# Patient Record
Sex: FEMALE | Race: WHITE | HISPANIC OR LATINO | Employment: UNEMPLOYED | URBAN - METROPOLITAN AREA
[De-identification: names, ages, dates, MRNs, and addresses within clinical notes are randomized per-mention and may not be internally consistent; named-entity substitution may affect disease eponyms.]

---

## 2023-03-20 ENCOUNTER — OFFICE VISIT (OUTPATIENT)
Dept: FAMILY MEDICINE CLINIC | Facility: CLINIC | Age: 33
End: 2023-03-20

## 2023-03-20 VITALS
RESPIRATION RATE: 18 BRPM | DIASTOLIC BLOOD PRESSURE: 68 MMHG | HEART RATE: 69 BPM | WEIGHT: 153.3 LBS | TEMPERATURE: 96.7 F | OXYGEN SATURATION: 99 % | HEIGHT: 61 IN | BODY MASS INDEX: 28.94 KG/M2 | SYSTOLIC BLOOD PRESSURE: 110 MMHG

## 2023-03-20 DIAGNOSIS — K02.9 DENTAL CAVITY: ICD-10-CM

## 2023-03-20 DIAGNOSIS — Z00.00 ANNUAL PHYSICAL EXAM: Primary | ICD-10-CM

## 2023-03-20 NOTE — PROGRESS NOTES
[address] [facility]    NAME: Socorro Jones  AGE: 28 y o  SEX: female  : 1990     DATE: 3/20/2023     Assessment and Plan:     Problem List Items Addressed This Visit        Digestive    Dental cavity     Follow with dentist         Relevant Orders    Ambulatory Referral to Dentistry       Other    Annual physical exam - Primary     Patient requesting referral to dentis and gyn          Relevant Orders    Ambulatory Referral to Obstetrics / Gynecology       Immunizations and preventive care screenings were discussed with patient today  Appropriate education was printed on patient's after visit summary  Counseling:  · Exercise: the importance of regular exercise/physical activity was discussed  Recommend exercise 3-5 times per week for at least 30 minutes  BMI Counseling: Body mass index is 28 94 kg/m²  The BMI is above normal  Nutrition recommendations include decreasing portion sizes, encouraging healthy choices of fruits and vegetables, decreasing fast food intake, consuming healthier snacks, limiting drinks that contain sugar, moderation in carbohydrate intake, increasing intake of lean protein, reducing intake of saturated and trans fat and reducing intake of cholesterol  Exercise recommendations include exercising 3-5 times per week  No pharmacotherapy was ordered  Rationale for BMI follow-up plan is due to patient being overweight or obese  Depression Screening and Follow-up Plan: Patient's depression screening was positive with a PHQ-2 score of 4  Their PHQ-9 score was 4  Return in 1 year (on 3/20/2024)  Chief Complaint:     Chief Complaint   Patient presents with   • New Patient Visit   • Anxiety   • Depression      History of Present Illness:   Socorro Jones 28 y o  female has a past medical history of Anxiety and Depression   Presenting today for annual physical  Patient migrated from [facility] four months ago and is currently trying to seek employment  Attempted to make appointment with dental clinic and they advise her to establish with pcp to obtain financial assistance and a referral  Patient has no current complaints, when asked about her history of anxiety and depression patient reports she feels better, symptoms were related to her migration to the [address] [place] [address] [address]; denies any symptoms at this time PHQ9- 4  Patient denies chest pain, SOB, dyspnea, dizziness; she is well appearing  Adult Annual Physical   Patient here for a comprehensive physical exam  The patient reports no problems  Diet and Physical Activity  · Diet/Nutrition: well balanced diet and consuming 3-5 servings of fruits/vegetables daily  · Exercise: no formal exercise  Depression Screening  PHQ-2/9 Depression Screening    Little interest or pleasure in doing things: 2 - more than half the days  Feeling down, depressed, or hopeless: 2 - more than half the days  Trouble falling or staying asleep, or sleeping too much: 0 - not at all  Feeling tired or having little energy: 0 - not at all  Poor appetite or overeatin - not at all  Feeling bad about yourself - or that you are a failure or have let yourself or your family down: 0 - not at all  Trouble concentrating on things, such as reading the newspaper or watching television: 0 - not at all  Moving or speaking so slowly that other people could have noticed  Or the opposite - being so fidgety or restless that you have been moving around a lot more than usual: 0 - not at all  Thoughts that you would be better off dead, or of hurting yourself in some way: 0 - not at all  PHQ-2 Score: 4  PHQ-2 Interpretation: POSITIVE depression screen  PHQ-9 Score: 4   PHQ-9 Interpretation: No or Minimal depression        General Health  · Sleep: gets 7-8 hours of sleep on average  · Hearing: normal - bilateral   · Vision: no vision problems     · Dental: no dental visits for >1 year, brushes teeth three times daily and flosses teeth occasionally  /GYN Health  · Last menstrual period: 1/10/2023  · Contraceptive method: injectable contraception  · History of STDs?: no      Review of Systems:     Review of Systems   Constitutional: Negative for chills and fever  HENT: Negative for ear pain and sore throat  Eyes: Negative for pain and visual disturbance  Respiratory: Negative for cough and shortness of breath  Cardiovascular: Negative for chest pain and palpitations  Gastrointestinal: Negative for abdominal pain and vomiting  Genitourinary: Negative for dysuria and hematuria  Musculoskeletal: Negative for arthralgias and back pain  Skin: Negative for color change and rash  Neurological: Negative for seizures and syncope  Psychiatric/Behavioral: Negative for behavioral problems, decreased concentration, sleep disturbance and suicidal ideas  The patient is not nervous/anxious  All other systems reviewed and are negative       Past Medical History:     Past Medical History:   Diagnosis Date   • Anxiety    • Depression       Past Surgical History:     Past Surgical History:   Procedure Laterality Date   •  SECTION        Social History:     Social History     Socioeconomic History   • Marital status:      Spouse name: None   • Number of children: None   • Years of education: None   • Highest education level: None   Occupational History   • None   Tobacco Use   • Smoking status: Never   • Smokeless tobacco: Never   Vaping Use   • Vaping Use: Never used   Substance and Sexual Activity   • Alcohol use: Not Currently   • Drug use: Never   • Sexual activity: None   Other Topics Concern   • None   Social History Narrative   • None     Social Determinants of Health     Financial Resource Strain: Not on file   Food Insecurity: Not on file   Transportation Needs: Not on file   Physical Activity: Not on file   Stress: Not on file   Social Connections: Not on file Intimate Partner Violence: Not on file   Housing Stability: Not on file      Family History:     Family History   Problem Relation Age of Onset   • Diabetes Mother    • Hypertension Mother    • Colon cancer Father    • Colon cancer Sister       Current Medications:     No current outpatient medications on file  No current facility-administered medications for this visit  Allergies:     No Known Allergies   Physical Exam:     /68 (BP Location: Left arm, Patient Position: Sitting, Cuff Size: Adult)   Pulse 69   Temp (!) 96 7 °F (35 9 °C) (Temporal)   Resp 18   Ht 5' 1 02" (1 55 m)   Wt 69 5 kg (153 lb 4 8 oz)   LMP  (LMP Unknown)   SpO2 99%   BMI 28 94 kg/m²     Physical Exam  Vitals and nursing note reviewed  Constitutional:       General: She is not in acute distress  Appearance: She is well-developed  HENT:      Head: Normocephalic and atraumatic  Right Ear: Tympanic membrane, ear canal and external ear normal       Left Ear: Tympanic membrane, ear canal and external ear normal       Nose: Nose normal       Mouth/Throat:      Mouth: Mucous membranes are moist      Eyes:      Conjunctiva/sclera: Conjunctivae normal    Cardiovascular:      Rate and Rhythm: Normal rate and regular rhythm  Pulses: Normal pulses  Heart sounds: Normal heart sounds  No murmur heard  Pulmonary:      Effort: Pulmonary effort is normal  No respiratory distress  Breath sounds: Normal breath sounds  Abdominal:      General: Bowel sounds are normal       Palpations: Abdomen is soft  Tenderness: There is no abdominal tenderness  Musculoskeletal:         General: No swelling  Normal range of motion  Cervical back: Normal range of motion and neck supple  Skin:     General: Skin is warm and dry  Capillary Refill: Capillary refill takes less than 2 seconds  Neurological:      Mental Status: She is alert     Psychiatric:         Attention and Perception: Attention normal  Mood and Affect: Mood normal          Speech: Speech normal          Behavior: Behavior normal          Thought Content: Thought content normal  Thought content does not include suicidal ideation  Thought content does not include suicidal plan            Lilliana Spainj 33

## 2023-03-22 ENCOUNTER — TELEPHONE (OUTPATIENT)
Dept: FAMILY MEDICINE CLINIC | Facility: CLINIC | Age: 33
End: 2023-03-22

## 2023-03-22 NOTE — TELEPHONE ENCOUNTER
Left voicemail to advise patient office vsiit note from 3/20 was signed  A copy was placed in  bin for her to come and get

## 2025-02-05 ENCOUNTER — HOSPITAL ENCOUNTER (EMERGENCY)
Facility: HOSPITAL | Age: 35
Discharge: HOME | End: 2025-02-05
Attending: INTERNAL MEDICINE
Payer: COMMERCIAL

## 2025-02-05 VITALS
OXYGEN SATURATION: 99 % | RESPIRATION RATE: 16 BRPM | SYSTOLIC BLOOD PRESSURE: 112 MMHG | DIASTOLIC BLOOD PRESSURE: 71 MMHG | BODY MASS INDEX: 28.3 KG/M2 | WEIGHT: 149.9 LBS | TEMPERATURE: 97.8 F | HEART RATE: 65 BPM

## 2025-02-05 DIAGNOSIS — R51.9 HEADACHE: Primary | ICD-10-CM

## 2025-02-05 DIAGNOSIS — M62.838 TRAPEZIUS MUSCLE SPASM: ICD-10-CM

## 2025-02-05 DIAGNOSIS — R42 LIGHTHEADEDNESS: ICD-10-CM

## 2025-02-05 LAB
ALBUMIN SERPL BCG-MCNC: 4.1 G/DL (ref 3.5–5)
ALP SERPL-CCNC: 90 U/L (ref 34–104)
ALT SERPL W P-5'-P-CCNC: 14 U/L (ref 7–52)
ANION GAP SERPL CALCULATED.3IONS-SCNC: 6 MMOL/L (ref 4–13)
AST SERPL W P-5'-P-CCNC: 16 U/L (ref 13–39)
ATRIAL RATE: 75 BPM
BASOPHILS # BLD AUTO: 0.03 THOUSANDS/ΜL (ref 0–0.1)
BASOPHILS NFR BLD AUTO: 0 % (ref 0–1)
BILIRUB SERPL-MCNC: 0.5 MG/DL (ref 0.2–1)
BUN SERPL-MCNC: 12 MG/DL (ref 5–25)
CALCIUM SERPL-MCNC: 8.8 MG/DL (ref 8.4–10.2)
CARDIAC TROPONIN I PNL SERPL HS: <2 NG/L (ref ?–50)
CHLORIDE SERPL-SCNC: 105 MMOL/L (ref 96–108)
CO2 SERPL-SCNC: 26 MMOL/L (ref 21–32)
CREAT SERPL-MCNC: 0.59 MG/DL (ref 0.6–1.3)
EOSINOPHIL # BLD AUTO: 1.07 THOUSAND/ΜL (ref 0–0.61)
EOSINOPHIL NFR BLD AUTO: 13 % (ref 0–6)
ERYTHROCYTE [DISTWIDTH] IN BLOOD BY AUTOMATED COUNT: 13.1 % (ref 11.6–15.1)
EXT PREGNANCY TEST URINE: NEGATIVE
EXT. CONTROL: NORMAL
GFR SERPL CREATININE-BSD FRML MDRD: 118 ML/MIN/1.73SQ M
GLUCOSE SERPL-MCNC: 88 MG/DL (ref 65–140)
GLUCOSE SERPL-MCNC: 90 MG/DL (ref 65–140)
HCT VFR BLD AUTO: 37.2 % (ref 34.8–46.1)
HGB BLD-MCNC: 12.2 G/DL (ref 11.5–15.4)
IMM GRANULOCYTES # BLD AUTO: 0.03 THOUSAND/UL (ref 0–0.2)
IMM GRANULOCYTES NFR BLD AUTO: 0 % (ref 0–2)
LYMPHOCYTES # BLD AUTO: 2.33 THOUSANDS/ΜL (ref 0.6–4.47)
LYMPHOCYTES NFR BLD AUTO: 28 % (ref 14–44)
MCH RBC QN AUTO: 30.2 PG (ref 26.8–34.3)
MCHC RBC AUTO-ENTMCNC: 32.8 G/DL (ref 31.4–37.4)
MCV RBC AUTO: 92 FL (ref 82–98)
MONOCYTES # BLD AUTO: 0.83 THOUSAND/ΜL (ref 0.17–1.22)
MONOCYTES NFR BLD AUTO: 10 % (ref 4–12)
NEUTROPHILS # BLD AUTO: 4.01 THOUSANDS/ΜL (ref 1.85–7.62)
NEUTS SEG NFR BLD AUTO: 49 % (ref 43–75)
NRBC BLD AUTO-RTO: 0 /100 WBCS
P AXIS: 26 DEGREES
PLATELET # BLD AUTO: 272 THOUSANDS/UL (ref 149–390)
PMV BLD AUTO: 10.7 FL (ref 8.9–12.7)
POTASSIUM SERPL-SCNC: 3.9 MMOL/L (ref 3.5–5.3)
PR INTERVAL: 134 MS
PROT SERPL-MCNC: 7.3 G/DL (ref 6.4–8.4)
QRS AXIS: 54 DEGREES
QRSD INTERVAL: 76 MS
QT INTERVAL: 386 MS
QTC INTERVAL: 432 MS
RBC # BLD AUTO: 4.04 MILLION/UL (ref 3.81–5.12)
SODIUM SERPL-SCNC: 137 MMOL/L (ref 135–147)
T WAVE AXIS: 51 DEGREES
VENTRICULAR RATE: 75 BPM
WBC # BLD AUTO: 8.3 THOUSAND/UL (ref 4.31–10.16)

## 2025-02-05 PROCEDURE — 96361 HYDRATE IV INFUSION ADD-ON: CPT

## 2025-02-05 PROCEDURE — 99284 EMERGENCY DEPT VISIT MOD MDM: CPT

## 2025-02-05 PROCEDURE — 81025 URINE PREGNANCY TEST: CPT | Performed by: INTERNAL MEDICINE

## 2025-02-05 PROCEDURE — 85025 COMPLETE CBC W/AUTO DIFF WBC: CPT | Performed by: INTERNAL MEDICINE

## 2025-02-05 PROCEDURE — 93010 ELECTROCARDIOGRAM REPORT: CPT | Performed by: INTERNAL MEDICINE

## 2025-02-05 PROCEDURE — 82948 REAGENT STRIP/BLOOD GLUCOSE: CPT

## 2025-02-05 PROCEDURE — 80053 COMPREHEN METABOLIC PANEL: CPT | Performed by: INTERNAL MEDICINE

## 2025-02-05 PROCEDURE — 96374 THER/PROPH/DIAG INJ IV PUSH: CPT

## 2025-02-05 PROCEDURE — 36415 COLL VENOUS BLD VENIPUNCTURE: CPT | Performed by: INTERNAL MEDICINE

## 2025-02-05 PROCEDURE — 84484 ASSAY OF TROPONIN QUANT: CPT | Performed by: INTERNAL MEDICINE

## 2025-02-05 PROCEDURE — 93005 ELECTROCARDIOGRAM TRACING: CPT

## 2025-02-05 PROCEDURE — 99285 EMERGENCY DEPT VISIT HI MDM: CPT | Performed by: INTERNAL MEDICINE

## 2025-02-05 RX ORDER — KETOROLAC TROMETHAMINE 30 MG/ML
15 INJECTION, SOLUTION INTRAMUSCULAR; INTRAVENOUS ONCE
Status: COMPLETED | OUTPATIENT
Start: 2025-02-05 | End: 2025-02-05

## 2025-02-05 RX ORDER — NAPROXEN 500 MG/1
500 TABLET ORAL 2 TIMES DAILY WITH MEALS
Qty: 30 TABLET | Refills: 0 | Status: SHIPPED | OUTPATIENT
Start: 2025-02-05

## 2025-02-05 RX ORDER — CYCLOBENZAPRINE HCL 10 MG
10 TABLET ORAL 2 TIMES DAILY PRN
Qty: 20 TABLET | Refills: 0 | Status: SHIPPED | OUTPATIENT
Start: 2025-02-05

## 2025-02-05 RX ORDER — KETOROLAC TROMETHAMINE 30 MG/ML
30 INJECTION, SOLUTION INTRAMUSCULAR; INTRAVENOUS ONCE
Status: DISCONTINUED | OUTPATIENT
Start: 2025-02-05 | End: 2025-02-05

## 2025-02-05 RX ORDER — CYCLOBENZAPRINE HCL 10 MG
10 TABLET ORAL ONCE
Status: COMPLETED | OUTPATIENT
Start: 2025-02-05 | End: 2025-02-05

## 2025-02-05 RX ADMIN — KETOROLAC TROMETHAMINE 15 MG: 30 INJECTION, SOLUTION INTRAMUSCULAR; INTRAVENOUS at 11:18

## 2025-02-05 RX ADMIN — CYCLOBENZAPRINE HYDROCHLORIDE 10 MG: 10 TABLET, FILM COATED ORAL at 11:57

## 2025-02-05 RX ADMIN — SODIUM CHLORIDE 1000 ML: 0.9 INJECTION, SOLUTION INTRAVENOUS at 11:12

## 2025-02-05 NOTE — ED PROVIDER NOTES
Time reflects when diagnosis was documented in both MDM as applicable and the Disposition within this note       Time User Action Codes Description Comment    2/5/2025 11:50 AM PamelaIvory zenga Add [R51.9] Headache     2/5/2025 11:50 AM Pamela Juliana Add [R42] Lightheadedness     2/5/2025 11:50 AM Pamela Juliana Add [M62.838] Trapezius muscle spasm           ED Disposition       ED Disposition   Discharge    Condition   Stable    Date/Time   Wed Feb 5, 2025 11:49 AM    Comment   Terra Edwardsro discharge to home/self care.                   Assessment & Plan       Medical Decision Making  CBC as marker of infectivity, hemoconcentration for hydration status  CMP to check for electrolytes, renal function, liver function   Lipase to check for pancreatitis  EKG and troponin to rule out cardiac cause of dizziness    Problems Addressed:  Headache: acute illness or injury  Lightheadedness: acute illness or injury  Trapezius muscle spasm: acute illness or injury    Amount and/or Complexity of Data Reviewed  External Data Reviewed: labs, radiology and ECG.  Labs: ordered. Decision-making details documented in ED Course.  ECG/medicine tests: ordered and independent interpretation performed. Decision-making details documented in ED Course.    Risk  Prescription drug management.        ED Course as of 02/05/25 1403   Wed Feb 05, 2025   1058 POC Glucose: 88   1101 EKG: NSR with sinus arrhythmia, normal EKG   1139 WBC: 8.30   1139 Hemoglobin: 12.2   1139 Platelet Count: 272   1141 hs TnI 0hr: <2   1142 Patient's gait is normal, able to walk hallways unassisted       Medications   sodium chloride 0.9 % bolus 1,000 mL (0 mL Intravenous Stopped 2/5/25 1201)   ketorolac (TORADOL) injection 15 mg (15 mg Intravenous Given 2/5/25 1118)   cyclobenzaprine (FLEXERIL) tablet 10 mg (10 mg Oral Given 2/5/25 1157)       ED Risk Strat Scores                          SBIRT 20yo+      Flowsheet Row Most Recent Value   Initial Alcohol Screen: US  AUDIT-C     1. How often do you have a drink containing alcohol? 0 Filed at: 2025   2. How many drinks containing alcohol do you have on a typical day you are drinking?  0 Filed at: 2025   3a. Male UNDER 65: How often do you have five or more drinks on one occasion? 0 Filed at: 2025   3b. FEMALE Any Age, or MALE 65+: How often do you have 4 or more drinks on one occassion? 0 Filed at: 2025   Audit-C Score 0 Filed at: 2025   ANGÉLICA: How many times in the past year have you...    Used an illegal drug or used a prescription medication for non-medical reasons? Never Filed at: 2025                            History of Present Illness       Chief Complaint   Patient presents with    Headache     At work, blurry vision on right eye, painful in eyeball. Then felt slamming headache on the front of head.         Dizziness       Past Medical History:   Diagnosis Date    Anxiety     Depression       Past Surgical History:   Procedure Laterality Date     SECTION        Family History   Problem Relation Age of Onset    Diabetes Mother     Hypertension Mother     Colon cancer Father     Colon cancer Sister       Social History     Tobacco Use    Smoking status: Never    Smokeless tobacco: Never   Vaping Use    Vaping status: Never Used   Substance Use Topics    Alcohol use: Not Currently    Drug use: Never      E-Cigarette/Vaping    E-Cigarette Use Never User       E-Cigarette/Vaping Substances      I have reviewed and agree with the history as documented.     35-year-old woman presents to ED for evaluation of right-sided headache and lightheadedness.  Patient reports she was at work when she had a headache.  She reports that headache was frontal and partially behind her eyes.  She states she has had similar headaches in the past and denies worst headache of her life.  She states she took Tylenol but it did not help her headache.  She reports headache is  "worse when she moves her head to the right side and when she presses down on her right trapezius muscle.  She also felt lightheaded when she stood up.  Denies any vertiginous symptoms.  Her right eye also looked blurry.  States she has had this issue before, previously saw an \"eye doctor\" and states her workup was normal.  No recent changes in contacts or eyeglasses, does not wear either. Patient denies fevers, chills, chest pain, palpitations, abdominal pain, diarrhea, melena, hematochezia, dysuria, new skin rashes or numbness or tingling of the extremities.         used: Yes    Headache  Associated symptoms: dizziness    Dizziness  Associated symptoms: headaches        Review of Systems   Neurological:  Positive for dizziness and headaches.           Objective       ED Triage Vitals   Temperature Pulse Blood Pressure Respirations SpO2 Patient Position - Orthostatic VS   02/05/25 1045 02/05/25 1045 02/05/25 1045 02/05/25 1045 02/05/25 1045 02/05/25 1045   97.8 °F (36.6 °C) 70 111/62 16 98 % Sitting      Temp Source Heart Rate Source BP Location FiO2 (%) Pain Score    02/05/25 1045 02/05/25 1045 02/05/25 1045 -- 02/05/25 1118    Tympanic Monitor Right arm  8      Vitals      Date and Time Temp Pulse SpO2 Resp BP Pain Score FACES Pain Rating User   02/05/25 1149 -- 65 99 % 16 112/71 6 -- MG   02/05/25 1147 -- -- -- -- -- 6 -- MG   02/05/25 1118 -- -- -- -- -- 8 -- MG   02/05/25 1045 97.8 °F (36.6 °C) 70 98 % 16 111/62 -- -- JW            Physical Exam  PHYSICAL EXAM    Constitutional:  Well developed, no acute distress  HEENT:  Conjunctiva normal. Oropharynx moist  Respiratory:  No respiratory distress  Cardiovascular:  Normal rate  GI:  Soft, nondistended, nontender  :  No costovertebral angle tenderness   Musculoskeletal:  No edema, no tenderness, no deformities.  Tenderness to palpation over right trapezius muscle  Integument:  Well hydrated, no rash   Lymphatic:  No lymphadenopathy noted "   Neurologic:  Alert & oriented x 3, normal motor function, sensation normal, no focal deficits noted, no facial asymmetry, gait normal  Psychiatric:  Speech and behavior appropriate     Results Reviewed       Procedure Component Value Units Date/Time    Comprehensive metabolic panel [[id number]]  (Abnormal) Collected: 02/05/25 1101    Lab Status: Final result Specimen: Blood from Arm, Right Updated: 02/05/25 1141     Sodium 137 mmol/L      Potassium 3.9 mmol/L      Chloride 105 mmol/L      CO2 26 mmol/L      ANION GAP 6 mmol/L      BUN 12 mg/dL      Creatinine 0.59 mg/dL      Glucose 90 mg/dL      Calcium 8.8 mg/dL      AST 16 U/L      ALT 14 U/L      Alkaline Phosphatase 90 U/L      Total Protein 7.3 g/dL      Albumin 4.1 g/dL      Total Bilirubin 0.50 mg/dL      eGFR 118 ml/min/1.73sq m     Narrative:      National Kidney Disease Foundation guidelines for Chronic Kidney Disease (CKD):     Stage 1 with normal or high GFR (GFR > 90 mL/min/1.73 square meters)    Stage 2 Mild CKD (GFR = 60-89 mL/min/1.73 square meters)    Stage 3A Moderate CKD (GFR = 45-59 mL/min/1.73 square meters)    Stage 3B Moderate CKD (GFR = 30-44 mL/min/1.73 square meters)    Stage 4 Severe CKD (GFR = 15-29 mL/min/1.73 square meters)    Stage 5 End Stage CKD (GFR <15 mL/min/1.73 square meters)  Note: GFR calculation is accurate only with a steady state creatinine    HS Troponin 0hr (reflex protocol) [[id number]]  (Normal) Collected: 02/05/25 1101    Lab Status: Final result Specimen: Blood from Arm, Right Updated: 02/05/25 1139     hs TnI 0hr <2 ng/L     POCT pregnancy, urine [[id number]]  (Normal) Collected: 02/05/25 1120    Lab Status: Final result Updated: 02/05/25 1120     EXT Preg Test, Ur Negative     Control Valid    CBC and differential [[id number]]  (Abnormal) Collected: 02/05/25 1101    Lab Status: Final result Specimen: Blood from Arm, Right Updated: 02/05/25 1109     WBC 8.30 Thousand/uL      RBC 4.04 Million/uL      Hemoglobin 12.2  g/dL      Hematocrit 37.2 %      MCV 92 fL      MCH 30.2 pg      MCHC 32.8 g/dL      RDW 13.1 %      MPV 10.7 fL      Platelets 272 Thousands/uL      nRBC 0 /100 WBCs      Segmented % 49 %      Immature Grans % 0 %      Lymphocytes % 28 %      Monocytes % 10 %      Eosinophils Relative 13 %      Basophils Relative 0 %      Absolute Neutrophils 4.01 Thousands/µL      Absolute Immature Grans 0.03 Thousand/uL      Absolute Lymphocytes 2.33 Thousands/µL      Absolute Monocytes 0.83 Thousand/µL      Eosinophils Absolute 1.07 Thousand/µL      Basophils Absolute 0.03 Thousands/µL     Fingerstick Glucose (POCT) [[id number]]  (Normal) Collected: 02/05/25 1054    Lab Status: Final result Specimen: Blood Updated: 02/05/25 1055     POC Glucose 88 mg/dl             No orders to display       Procedures    ED Medication and Procedure Management   None     Discharge Medication List as of 2/5/2025 11:53 AM        START taking these medications    Details   cyclobenzaprine (FLEXERIL) 10 mg tablet Take 1 tablet (10 mg total) by mouth 2 (two) times a day as needed for muscle spasms, Starting Wed 2/5/2025, Normal      naproxen (Naprosyn) 500 mg tablet Take 1 tablet (500 mg total) by mouth 2 (two) times a day with meals, Starting Wed 2/5/2025, Normal           No discharge procedures on file.  ED SEPSIS DOCUMENTATION   Time reflects when diagnosis was documented in both MDM as applicable and the Disposition within this note       Time User Action Codes Description Comment    2/5/2025 11:50 AM Juliana Santiago Add [R51.9] Headache     2/5/2025 11:50 AM Juliana Santiago Add [R42] Lightheadedness     2/5/2025 11:50 AM Juliana Santiago Add [M62.838] Trapezius muscle spasm                  Juliana Santiago MD  02/05/25 [id number]

## 2025-02-05 NOTE — Clinical Note
Terra Griffin was seen and treated in our emergency department on 2/5/2025.                Diagnosis:     Terra  .    She may return on this date: 02/07/2025         If you have any questions or concerns, please don't hesitate to call.      Juliana Santiago MD    ______________________________           _______________          _______________  Hospital Representative                              Date                                Time